# Patient Record
Sex: FEMALE | Race: WHITE | NOT HISPANIC OR LATINO
[De-identification: names, ages, dates, MRNs, and addresses within clinical notes are randomized per-mention and may not be internally consistent; named-entity substitution may affect disease eponyms.]

---

## 2018-04-30 ENCOUNTER — APPOINTMENT (OUTPATIENT)
Dept: CARDIOLOGY | Facility: CLINIC | Age: 53
End: 2018-04-30
Payer: COMMERCIAL

## 2018-04-30 VITALS
DIASTOLIC BLOOD PRESSURE: 70 MMHG | SYSTOLIC BLOOD PRESSURE: 115 MMHG | HEART RATE: 67 BPM | WEIGHT: 138 LBS | HEIGHT: 65 IN | BODY MASS INDEX: 22.99 KG/M2 | RESPIRATION RATE: 16 BRPM

## 2018-04-30 PROCEDURE — 99213 OFFICE O/P EST LOW 20 MIN: CPT

## 2018-04-30 PROCEDURE — 93000 ELECTROCARDIOGRAM COMPLETE: CPT | Mod: 59

## 2018-11-08 ENCOUNTER — APPOINTMENT (OUTPATIENT)
Dept: CARDIOLOGY | Facility: CLINIC | Age: 53
End: 2018-11-08
Payer: COMMERCIAL

## 2018-11-08 VITALS
SYSTOLIC BLOOD PRESSURE: 110 MMHG | BODY MASS INDEX: 22.99 KG/M2 | RESPIRATION RATE: 18 BRPM | DIASTOLIC BLOOD PRESSURE: 70 MMHG | WEIGHT: 138 LBS | HEIGHT: 65 IN | HEART RATE: 65 BPM

## 2018-11-08 PROCEDURE — 93000 ELECTROCARDIOGRAM COMPLETE: CPT

## 2018-11-08 PROCEDURE — 99213 OFFICE O/P EST LOW 20 MIN: CPT

## 2018-11-08 NOTE — REASON FOR VISIT
[FreeTextEntry1] : Patient had presented here for a cardiac evaluation with concerns about periodic palpitations. Most often nocturnal. Not associated with any other symptoms. Heart rate seems to be regular. Cannot identify any provocation.

## 2018-11-08 NOTE — HISTORY OF PRESENT ILLNESS
[FreeTextEntry1] : Has a history of a right bundle branch block. Does not have any known structural heart disease by echo several years ago.. Denies any exertional symptoms.\par Under some degree of stress with regard to her children. Many years postmenopausal.\par underwent 24 hour holter\par symptoms worse with stress and late day coffee.

## 2018-11-08 NOTE — ASSESSMENT
[FreeTextEntry1] : ECG: Normal sinus rhythm at 65 beats per minute. Right bundle branch block pattern. No significant ST or T wave changes. Unchanged from baseline.\par \par 24-hour Holter monitor showed an average heart rate 72. Range of  beats per minute. An occasional APC. Nothing that correlated well with symptoms.\par \par Impression patient with new onset of nocturnal palpitations that do not seem to be associated with any pathology\par Baseline right bundle branch block pattern unchanged.\par Very atypical anterior chest wall pain syndrome.\par Increased stress  and caffeine are provoking agents\par \par \par Plan:\par Cut back considerably on her caffeine.\par Relaxation techniques.\par Increase exercise\par Followup in 1 year

## 2020-07-08 ENCOUNTER — APPOINTMENT (OUTPATIENT)
Dept: CARDIOLOGY | Facility: CLINIC | Age: 55
End: 2020-07-08
Payer: COMMERCIAL

## 2020-07-08 ENCOUNTER — NON-APPOINTMENT (OUTPATIENT)
Age: 55
End: 2020-07-08

## 2020-07-08 VITALS
HEIGHT: 65 IN | OXYGEN SATURATION: 98 % | SYSTOLIC BLOOD PRESSURE: 118 MMHG | RESPIRATION RATE: 16 BRPM | BODY MASS INDEX: 21.66 KG/M2 | WEIGHT: 130 LBS | DIASTOLIC BLOOD PRESSURE: 65 MMHG | TEMPERATURE: 97.6 F | HEART RATE: 67 BPM

## 2020-07-08 PROCEDURE — 99214 OFFICE O/P EST MOD 30 MIN: CPT

## 2020-07-08 PROCEDURE — 93000 ELECTROCARDIOGRAM COMPLETE: CPT

## 2020-07-08 RX ORDER — MULTIVITAMIN
TABLET ORAL
Refills: 0 | Status: ACTIVE | COMMUNITY

## 2020-07-08 RX ORDER — UBIDECARENONE/VIT E ACET 100MG-5
100 CAPSULE ORAL
Refills: 0 | Status: ACTIVE | COMMUNITY

## 2020-07-08 RX ORDER — MENTHOL/CAMPHOR 0.5 %-0.5%
1000 LOTION (ML) TOPICAL
Refills: 0 | Status: ACTIVE | COMMUNITY

## 2020-07-08 NOTE — ASSESSMENT
[FreeTextEntry1] : ECG: Normal sinus rhythm at 67 beats per minute. Right bundle branch block pattern. No significant ST or T wave changes. Unchanged from baseline.\par \par 24-hour Holter monitor  4/30/18  showed an average heart rate 72. Range of  beats per minute. An occasional APC. Nothing that correlated well with symptoms.\par \par Impression:\par Patient with periodic nocturnal palpitations that do not seem to be associated with any pathology\par Baseline right bundle branch block pattern unchanged.\par Very atypical anterior chest wall pain syndrome.\par Increased stress  and caffeine are provoking agents\par \par \par Plan:\par Cut back considerably on her caffeine.\par Relaxation techniques.\par Increase exercise\par Followup in 1 year

## 2020-07-08 NOTE — REASON FOR VISIT
[FreeTextEntry1] : Patient  presents here for a cardiac evaluation with concerns about periodic palpitations. Most often nocturnal. Not associated with any other symptoms. Heart rate seems to be regular. Cannot identify any provocation.

## 2020-07-08 NOTE — HISTORY OF PRESENT ILLNESS
[FreeTextEntry1] : Has a history of a right bundle branch block. Does not have any known structural heart disease by echo several years ago.. Denies any exertional symptoms.\par Under some degree of stress with regard to her children. Many years postmenopausal.\par \par symptoms worse with stress and late day coffee.\par \par Also has complaints about some jaw pain with radiation into her neck and anterior chest wall. This too seems to be increased with stress.

## 2021-05-26 ENCOUNTER — APPOINTMENT (OUTPATIENT)
Dept: OTOLARYNGOLOGY | Facility: CLINIC | Age: 56
End: 2021-05-26

## 2021-12-01 ENCOUNTER — APPOINTMENT (OUTPATIENT)
Dept: BREAST CENTER | Facility: CLINIC | Age: 56
End: 2021-12-01
Payer: COMMERCIAL

## 2021-12-01 VITALS
HEIGHT: 65 IN | BODY MASS INDEX: 21.49 KG/M2 | WEIGHT: 129 LBS | DIASTOLIC BLOOD PRESSURE: 71 MMHG | HEART RATE: 73 BPM | SYSTOLIC BLOOD PRESSURE: 116 MMHG

## 2021-12-01 DIAGNOSIS — Z80.3 FAMILY HISTORY OF MALIGNANT NEOPLASM OF BREAST: ICD-10-CM

## 2021-12-01 DIAGNOSIS — Z80.1 FAMILY HISTORY OF MALIGNANT NEOPLASM OF TRACHEA, BRONCHUS AND LUNG: ICD-10-CM

## 2021-12-01 PROCEDURE — 99204 OFFICE O/P NEW MOD 45 MIN: CPT

## 2021-12-01 RX ORDER — MULTIVIT-MIN/IRON/FOLIC ACID/K 18-600-40
CAPSULE ORAL
Refills: 0 | Status: ACTIVE | COMMUNITY

## 2021-12-01 RX ORDER — PNV NO.95/FERROUS FUM/FOLIC AC 28MG-0.8MG
TABLET ORAL
Refills: 0 | Status: ACTIVE | COMMUNITY

## 2021-12-01 NOTE — HISTORY OF PRESENT ILLNESS
[FreeTextEntry1] : Ms. Healy is a 56 year old woman who presents for a consultation for breast pain. As she was reluctant to be on bisphosphonates for osteoporosis, starting in January this year, she was started on pellets of bioidentical hormones with estradiol and testosterone that were injected in the pelvis every 4 months. She was also started on progesterone pills. By June, she had significant breast pain diffusely in both breasts that woke her up at night if she moved, and was improved when her bra was on. No palpable breast or axillary lumps, nipple discharge, or skin changes. In October she had postmenopausal vaginal bleeding and underwent a D&C which was benign.\par \par Her family history is significant for breast cancer in a paternal aunt in her 40's.

## 2021-12-01 NOTE — PAST MEDICAL HISTORY
[Menarche Age ____] : age at menarche was [unfilled] [Menopause Age____] : age at menopause was [unfilled] [Total Preg ___] : G[unfilled] [Live Births ___] : P[unfilled]  [Age At Live Birth ___] : Age at live birth: [unfilled] [FreeTextEntry8] : 2 mo

## 2021-12-01 NOTE — CONSULT LETTER
[Dear  ___] : Dear  [unfilled], [Consult Letter:] : I had the pleasure of evaluating your patient, [unfilled]. [Please see my note below.] : Please see my note below. [Consult Closing:] : Thank you very much for allowing me to participate in the care of this patient.  If you have any questions, please do not hesitate to contact me. [Sincerely,] : Sincerely, [FreeTextEntry3] : Pratibha Spivey MD FACS  [DrNhi  ___] : Dr. HUITRON

## 2022-01-19 ENCOUNTER — NON-APPOINTMENT (OUTPATIENT)
Age: 57
End: 2022-01-19

## 2022-02-09 ENCOUNTER — APPOINTMENT (OUTPATIENT)
Dept: CARDIOLOGY | Facility: CLINIC | Age: 57
End: 2022-02-09
Payer: COMMERCIAL

## 2022-02-09 VITALS
OXYGEN SATURATION: 98 % | HEART RATE: 54 BPM | BODY MASS INDEX: 21.66 KG/M2 | SYSTOLIC BLOOD PRESSURE: 110 MMHG | RESPIRATION RATE: 16 BRPM | DIASTOLIC BLOOD PRESSURE: 70 MMHG | WEIGHT: 130 LBS | HEIGHT: 65 IN

## 2022-02-09 PROCEDURE — 99214 OFFICE O/P EST MOD 30 MIN: CPT

## 2022-02-09 PROCEDURE — 93000 ELECTROCARDIOGRAM COMPLETE: CPT

## 2022-02-09 RX ORDER — THYROID, PORCINE 30 MG/1
30 TABLET ORAL
Refills: 0 | Status: DISCONTINUED | COMMUNITY
End: 2022-02-09

## 2022-02-09 NOTE — HISTORY OF PRESENT ILLNESS
[FreeTextEntry1] : Has a history of a right bundle branch block. Does not have any known structural heart disease by echo several years ago.. Denies any exertional symptoms.\par Under some degree of stress with regard to her children. Many years postmenopausal.\par \par symptoms worse with stress and late day coffee.\par

## 2022-02-09 NOTE — ASSESSMENT
[FreeTextEntry1] : ECG: Sinus bradycardia 54 bpm.  Right bundle branch block pattern.  No significant CTB changes.\par \par 24-hour Holter monitor  4/30/18  showed an average heart rate 72. Range of  beats per minute. An occasional APC. Nothing that correlated well with symptoms.\par \par Impression:\par 1.  Patient has a positional sensation that she is hearing her heartbeat in her ear while on her pillow in bed at night.\par This is only occurred on 2 occasions and the heartbeat is regular and had a normal rate.\par I explained to her that this is most likely the benign apposition of an artery up against her eustachian canal.\par \par 2.  Patient with periodic nocturnal palpitations that do not seem to be associated with any pathology.\par     The symptoms are longstanding, possibly exacerbated by caffeine, and seem most consistent withatrial or ventricular extrasystoles.\par \par 3.Baseline right bundle branch block pattern unchanged.\par \par Plan:\par 1.  Reassured her that the aforementioned auditory symptoms are not necessarily representative of pathology unless they began to occur when she is upright and through the day then I would not proceed with any further testing.\par \par 2.Cut back considerably on her caffeine.\par \par 3.Relaxation techniques.\par \par 4.  Continue all of her regular exercise\par Followup in 1 year

## 2022-02-09 NOTE — REASON FOR VISIT
[FreeTextEntry1] : Patient  presents here for a cardiac evaluation with concerns about \par nocturnal complaints of hearing her heartbeat in her ear.  \par This is new and distinct from her usual periodic palpitations, which also tend to be often nocturnal. Not associated with any other symptoms. Heart rate seems to be regular. Cannot identify any provocation.\par \par Otherwise feels fairly well.  Exercises regularly without difficulty.\par No shortness of breath chest pain.  No PND orthopnea edema.\par Exercise includes both aerobic as well as resistance.\par Has augmented her resistance and response to some osteopenia.\par Drinks several cups of coffee a day.

## 2022-07-12 ENCOUNTER — APPOINTMENT (OUTPATIENT)
Dept: BREAST CENTER | Facility: CLINIC | Age: 57
End: 2022-07-12

## 2022-07-12 VITALS
DIASTOLIC BLOOD PRESSURE: 60 MMHG | BODY MASS INDEX: 21.49 KG/M2 | WEIGHT: 129 LBS | SYSTOLIC BLOOD PRESSURE: 96 MMHG | HEART RATE: 63 BPM | HEIGHT: 65 IN

## 2022-07-12 DIAGNOSIS — R92.2 INCONCLUSIVE MAMMOGRAM: ICD-10-CM

## 2022-07-12 DIAGNOSIS — Z87.898 PERSONAL HISTORY OF OTHER SPECIFIED CONDITIONS: ICD-10-CM

## 2022-07-12 PROCEDURE — 99214 OFFICE O/P EST MOD 30 MIN: CPT

## 2022-07-12 NOTE — PHYSICAL EXAM
[Normocephalic] : normocephalic [Atraumatic] : atraumatic [Sclera nonicteric] : sclera nonicteric [Supple] : supple [No Supraclavicular Adenopathy] : no supraclavicular adenopathy [No Cervical Adenopathy] : no cervical adenopathy [Clear to Auscultation Bilat] : clear to auscultation bilaterally [Normal Sinus Rhythm] : normal sinus rhythm [No Rubs] : no pericardial rub [Examined in the supine and seated position] : examined in the supine and seated position [Bra Size: ___] : Bra Size: [unfilled] [No dominant masses] : no dominant masses in right breast  [No dominant masses] : no dominant masses left breast [No Nipple Retraction] : no left nipple retraction [No Nipple Discharge] : no left nipple discharge [No Axillary Lymphadenopathy] : no left axillary lymphadenopathy [Soft] : abdomen soft [No Edema] : no edema [No Rashes] : no rashes [No Ulceration] : no ulceration

## 2022-07-12 NOTE — HISTORY OF PRESENT ILLNESS
[FreeTextEntry1] : Ms. Healy is a 57 year old woman here for a follow-up for breast pain. She was reluctant to be on bisphosphonates for osteoporosis and was started in January 2021 on injections of pellets of bioidentical hormones with estradiol and testosterone, as well as progesterone pills. By June 2021, she had significant diffuse bilateral breast pain that woke her up at night if she moved, and was improved when her bra was on. She had postmenopausal vaginal bleeding in October 2021 and underwent a D&C which was benign. Since then, she is off the progesterone pills. Her last injection was of the lowest dose of testosterone, and she hopes to wean off after one more injection. She no longer has any breast pain.\par \par Her family history is significant for breast cancer in a paternal aunt in her 40's.

## 2023-03-30 ENCOUNTER — NON-APPOINTMENT (OUTPATIENT)
Age: 58
End: 2023-03-30

## 2023-03-30 ENCOUNTER — APPOINTMENT (OUTPATIENT)
Dept: CARDIOLOGY | Facility: CLINIC | Age: 58
End: 2023-03-30
Payer: COMMERCIAL

## 2023-03-30 VITALS
HEIGHT: 65 IN | HEART RATE: 70 BPM | BODY MASS INDEX: 21.99 KG/M2 | DIASTOLIC BLOOD PRESSURE: 60 MMHG | RESPIRATION RATE: 14 BRPM | SYSTOLIC BLOOD PRESSURE: 100 MMHG | WEIGHT: 132 LBS

## 2023-03-30 DIAGNOSIS — Z09 ENCOUNTER FOR FOLLOW-UP EXAMINATION AFTER COMPLETED TREATMENT FOR CONDITIONS OTHER THAN MALIGNANT NEOPLASM: ICD-10-CM

## 2023-03-30 PROCEDURE — 99214 OFFICE O/P EST MOD 30 MIN: CPT | Mod: 25

## 2023-03-30 PROCEDURE — 93000 ELECTROCARDIOGRAM COMPLETE: CPT

## 2023-03-30 NOTE — REASON FOR VISIT
[FreeTextEntry1] : ALEXIA LAZO is a 57 year-old F presents here for cardiac evaluation following recent ER visit for palpitations and new onset atrial fibrillation. \par

## 2023-03-30 NOTE — ASSESSMENT
[FreeTextEntry1] : EKG: sinus rhythm at 70 bpm, RBBB as seen on prior EKG's , low voltage in precordial leads\par \par Impression:\par \par 1. New onset atrial fibrillation. [id number] score 0 (female, no other risk factors). EKG now shows sinus rhythm and RBBB (unchanged from prior). \par \par 2. Hx of milder palpitations, unclear whether she has been in atrial fibrillation in the past. \par \par 3. Blood pressure low normal, no symptoms of hypotension. \par \par 4. No anginal symptoms. EKG shows no ischemic changes. \par \par Plan:\par \par 1. 30-day event monitor to evaluate for recurrence of atrial fibrillation. \par \par 2. Will hold off on anticoagulation for now given [id number] score 0, suggesting low risk. \par \par 3. Exercise stress test to evaluate for any exercise induced ischemia. \par \par 4. Echocardiogram to evaluate for any structural hear disease. \par \par 5. Decrease caffeine intake. Avoid alcohol. Ensure adequate hydration. \par \par Clinical followup in 6 weeks or sooner if needed. \par

## 2023-03-30 NOTE — PHYSICAL EXAM
[Normal S1, S2] : normal S1, S2 [Murmur] : murmur [Normal] : alert and oriented, normal memory [de-identified] : I/VI systolic murmur

## 2023-03-30 NOTE — HISTORY OF PRESENT ILLNESS
[FreeTextEntry1] : Reports a history of palpitations usually lasting a few minutes and resolving on its own, occurring once a month. On 3/22/23, while  drinking coffee, experienced palpitations "stronger than usual". At  PMD -  EKG with new onset atrial fibrillation with RVR. She was sent to [facility] ER for evaluation where palpitations resolved on its own and she converted back to sinus rhythm. \par \par Patient reports increased stress lately related to her daughter being injured, and her  having a new project. She has not had much sleep lately due to her dog that has a condition and she has to wake up much earlier now. \par \par Drinks 2 large cups of coffee per day, 1 glass of wine per week. Admits to drinking very little water throughout the day. \par \par She denies any shortness of breath, MONTERO, chest pain, presyncope or syncope.

## 2023-04-19 ENCOUNTER — APPOINTMENT (OUTPATIENT)
Dept: CARDIOLOGY | Facility: CLINIC | Age: 58
End: 2023-04-19

## 2023-05-01 ENCOUNTER — APPOINTMENT (OUTPATIENT)
Dept: CARDIOLOGY | Facility: CLINIC | Age: 58
End: 2023-05-01
Payer: COMMERCIAL

## 2023-05-01 PROCEDURE — 93015 CV STRESS TEST SUPVJ I&R: CPT

## 2023-05-11 ENCOUNTER — APPOINTMENT (OUTPATIENT)
Dept: CARDIOLOGY | Facility: CLINIC | Age: 58
End: 2023-05-11
Payer: COMMERCIAL

## 2023-05-11 PROCEDURE — 93306 TTE W/DOPPLER COMPLETE: CPT

## 2023-06-08 ENCOUNTER — APPOINTMENT (OUTPATIENT)
Dept: CARDIOLOGY | Facility: CLINIC | Age: 58
End: 2023-06-08
Payer: COMMERCIAL

## 2023-06-08 VITALS
OXYGEN SATURATION: 98 % | BODY MASS INDEX: 21.33 KG/M2 | DIASTOLIC BLOOD PRESSURE: 60 MMHG | HEART RATE: 57 BPM | RESPIRATION RATE: 16 BRPM | HEIGHT: 65 IN | SYSTOLIC BLOOD PRESSURE: 104 MMHG | WEIGHT: 128 LBS

## 2023-06-08 DIAGNOSIS — I48.91 UNSPECIFIED ATRIAL FIBRILLATION: ICD-10-CM

## 2023-06-08 DIAGNOSIS — R00.2 PALPITATIONS: ICD-10-CM

## 2023-06-08 DIAGNOSIS — I45.10 UNSPECIFIED RIGHT BUNDLE-BRANCH BLOCK: ICD-10-CM

## 2023-06-08 DIAGNOSIS — R07.89 OTHER CHEST PAIN: ICD-10-CM

## 2023-06-08 PROCEDURE — 99214 OFFICE O/P EST MOD 30 MIN: CPT

## 2023-06-08 NOTE — ASSESSMENT
[FreeTextEntry1] : Event monitor 4/21-5/5/2023\par Average heart rate-65\par Minimum heart rate-42\par Maximum heart rate-161\par \par Echocardiogram 5/11/2023:\par Normal LV size and function LVEF 60%\par No significant valvular disease or pericardial effusion.\par \par Stress test 5/1/2023:\par Time: 7 minutes (7 METS)\par Heart rate: 169 Parenta 106%)\par Blood pressure: Normal response 150/70\par ECG: No significant ST-T wave changes.\par \par Impression:\par \par 1. New onset atrial fibrillation. [id number] score 0 (female, no other risk factors). EKG now shows sinus rhythm and RBBB (unchanged from prior).  Event monitoring clearly demonstrates short but multiple recurrences of PAF.  Longest run 19 beats \par fastest 146 bpm.\par \par 2. Hx of milder palpitations, unclear whether she has been in atrial fibrillation in the past.  Has PVCs as well.\par \par 3. Blood pressure low normal, no symptoms of hypotension. \par \par 4. No anginal symptoms. EKG shows no ischemic changes. \par \par Plan:\par \par 1.  Aspirin 81 mg p.o. daily.\par \par 2.  Toprol XL 25 mg half tablet p.o. nightly.\par \par 3. Decrease caffeine intake. Avoid alcohol. Ensure adequate hydration. \par \par Clinical followup in 6 weeks or sooner if needed. \par

## 2023-06-08 NOTE — REVIEW OF SYSTEMS
[Palpitations] : palpitations [Negative] : Heme/Lymph [Weight Loss (___ Lbs)] : [unfilled] ~Ulb weight loss [Weight Gain (___ Lbs)] : no recent weight gain

## 2023-06-08 NOTE — PHYSICAL EXAM
[Normal S1, S2] : normal S1, S2 [Murmur] : murmur [Normal] : alert and oriented, normal memory [de-identified] : I/VI systolic murmur

## 2023-06-08 NOTE — REASON FOR VISIT
[FreeTextEntry1] : ALEXIA LAZO is a 57 year-old F presents here for cardiac evaluation regarding history of palpitations and new onset atrial fibrillation. \par \par She has undergone echocardiography, exercise stress testing, 2-week event monitoring.\par \par States some increase of general life stresses with rare palpitations.  Nothing protracted\par

## 2023-06-08 NOTE — HISTORY OF PRESENT ILLNESS
[FreeTextEntry1] : Reports a history of palpitations usually lasting a few minutes and resolving on its own, occurring once a month. On 3/22/23, while  drinking coffee, experienced palpitations "stronger than usual". At  PMD -  EKG with new onset atrial fibrillation with RVR. She was sent to [facility] ER for evaluation where palpitations resolved on its own and she converted back to sinus rhythm. \par \par Patient reports increased stress lately related to her daughter being injured, and her  having a new project. She has not had much sleep lately due to her dog that has a condition and she has to wake up much earlier now. \par \par Drinks 2 large cups of coffee per day, 1 glass of wine per week. Admits to drinking very little water throughout the day. \par \par She denies any shortness of breath, MONTERO, chest pain, presyncope or syncope.

## 2023-09-14 ENCOUNTER — APPOINTMENT (OUTPATIENT)
Dept: CARDIOLOGY | Facility: CLINIC | Age: 58
End: 2023-09-14

## 2023-12-29 ENCOUNTER — NON-APPOINTMENT (OUTPATIENT)
Age: 58
End: 2023-12-29

## 2024-03-11 NOTE — PHYSICAL EXAM
[Atraumatic] : atraumatic [Normocephalic] : normocephalic [Supple] : supple [Sclera nonicteric] : sclera nonicteric [No Supraclavicular Adenopathy] : no supraclavicular adenopathy [No Cervical Adenopathy] : no cervical adenopathy [Normal Sinus Rhythm] : normal sinus rhythm [Clear to Auscultation Bilat] : clear to auscultation bilaterally [Examined in the supine and seated position] : examined in the supine and seated position [Bra Size: ___] : Bra Size: [unfilled] [No dominant masses] : no dominant masses in right breast  [No dominant masses] : no dominant masses left breast [No Nipple Retraction] : no left nipple retraction [No Nipple Discharge] : no left nipple discharge [No Axillary Lymphadenopathy] : no left axillary lymphadenopathy [No Rashes] : no rashes [No Edema] : no edema [No Ulceration] : no ulceration

## 2024-03-12 ENCOUNTER — APPOINTMENT (OUTPATIENT)
Dept: BREAST CENTER | Facility: CLINIC | Age: 59
End: 2024-03-12
Payer: COMMERCIAL

## 2024-03-12 VITALS
BODY MASS INDEX: 22.02 KG/M2 | HEIGHT: 64 IN | HEART RATE: 63 BPM | WEIGHT: 129 LBS | SYSTOLIC BLOOD PRESSURE: 110 MMHG | DIASTOLIC BLOOD PRESSURE: 66 MMHG

## 2024-03-12 DIAGNOSIS — N64.4 MASTODYNIA: ICD-10-CM

## 2024-03-12 DIAGNOSIS — Z12.39 ENCOUNTER FOR OTHER SCREENING FOR MALIGNANT NEOPLASM OF BREAST: ICD-10-CM

## 2024-03-12 PROCEDURE — 99214 OFFICE O/P EST MOD 30 MIN: CPT

## 2024-03-12 RX ORDER — METOPROLOL TARTRATE 25 MG/1
25 TABLET, FILM COATED ORAL DAILY
Qty: 90 | Refills: 1 | Status: DISCONTINUED | COMMUNITY
Start: 2023-06-08 | End: 2024-03-12

## 2024-03-12 RX ORDER — PROGESTERONE 200 MG/1
CAPSULE ORAL
Refills: 0 | Status: DISCONTINUED | COMMUNITY
End: 2024-03-12

## 2024-03-12 RX ORDER — CHROMIUM 200 MCG
TABLET ORAL
Refills: 0 | Status: DISCONTINUED | COMMUNITY
End: 2024-03-12

## 2024-03-12 NOTE — ASSESSMENT
[FreeTextEntry1] : Ms. Healy is a 58 year old woman with breast pain while on hormone replacement that has resolved with cessation of the hormone replacement. Her breast exam today is without suspicious findings. Routine breast care is reviewed. She can follow up on an as needed basis. She understands and is comfortable with the plan. She is encouraged to call if any questions or concerns arise.

## 2024-03-12 NOTE — HISTORY OF PRESENT ILLNESS
[FreeTextEntry1] : Ms. Healy is a 58 year old woman here for a follow-up for breast pain. She was reluctant to be on bisphosphonates for osteoporosis and was started in January 2021 on injections of pellets of bioidentical hormones with estradiol and testosterone, as well as progesterone pills. By June 2021, she had significant diffuse bilateral breast pain that woke her up at night if she moved, and was improved when her bra was on. She had postmenopausal vaginal bleeding in October 2021 and underwent a D&C which was benign. Since then, she is off the progesterone pills. Her last injection was of the lowest dose of testosterone, and she hopes to wean off after one more injection. She no longer had any breast pain. Since the last office visit, she has also been done with the testosterone injections. No more breast pain. No lumps.   Her family history is significant for breast cancer in a paternal aunt in her 40's.

## 2024-03-12 NOTE — PAST MEDICAL HISTORY
[Menarche Age ____] : age at menarche was [unfilled] [Menopause Age____] : age at menopause was [unfilled] [Live Births ___] : P[unfilled]  [Total Preg ___] : G[unfilled] [Age At Live Birth ___] : Age at live birth: [unfilled] [FreeTextEntry8] : 2 mo

## 2024-03-12 NOTE — DATA REVIEWED
[FreeTextEntry1] : I have independently reviewed the reports and the images.   B/l mammogram and US 3/7/24 - heterogenously dense - BIRADS 1

## 2024-04-15 ENCOUNTER — APPOINTMENT (OUTPATIENT)
Dept: RHEUMATOLOGY | Facility: CLINIC | Age: 59
End: 2024-04-15
Payer: COMMERCIAL

## 2024-04-15 VITALS
BODY MASS INDEX: 22.02 KG/M2 | SYSTOLIC BLOOD PRESSURE: 110 MMHG | DIASTOLIC BLOOD PRESSURE: 80 MMHG | HEIGHT: 64 IN | TEMPERATURE: 87 F | OXYGEN SATURATION: 99 % | WEIGHT: 129 LBS | HEART RATE: 64 BPM

## 2024-04-15 DIAGNOSIS — Z78.0 ASYMPTOMATIC MENOPAUSAL STATE: ICD-10-CM

## 2024-04-15 DIAGNOSIS — M81.0 AGE-RELATED OSTEOPOROSIS W/OUT CURRENT PATHOLOGICAL FRACTURE: ICD-10-CM

## 2024-04-15 DIAGNOSIS — Z83.438 FAMILY HISTORY OF OTHER DISORDER OF LIPOPROTEIN METABOLISM AND OTHER LIPIDEMIA: ICD-10-CM

## 2024-04-15 DIAGNOSIS — Z83.79 FAMILY HISTORY OF OTHER DISEASES OF THE DIGESTIVE SYSTEM: ICD-10-CM

## 2024-04-15 DIAGNOSIS — Z82.49 FAMILY HISTORY OF ISCHEMIC HEART DISEASE AND OTHER DISEASES OF THE CIRCULATORY SYSTEM: ICD-10-CM

## 2024-04-15 DIAGNOSIS — L30.9 DERMATITIS, UNSPECIFIED: ICD-10-CM

## 2024-04-15 DIAGNOSIS — Z83.3 FAMILY HISTORY OF DIABETES MELLITUS: ICD-10-CM

## 2024-04-15 PROCEDURE — G2211 COMPLEX E/M VISIT ADD ON: CPT

## 2024-04-15 PROCEDURE — 36415 COLL VENOUS BLD VENIPUNCTURE: CPT

## 2024-04-15 PROCEDURE — 99204 OFFICE O/P NEW MOD 45 MIN: CPT

## 2024-04-15 RX ORDER — ALENDRONATE SODIUM 70 MG/1
70 TABLET ORAL
Qty: 12 | Refills: 3 | Status: ACTIVE | COMMUNITY
Start: 2024-04-15 | End: 1900-01-01

## 2024-04-15 NOTE — REASON FOR VISIT
[Initial Eval - Existing Diagnosis] : an initial evaluation of an existing diagnosis [FreeTextEntry1] : osteoporosis

## 2024-04-15 NOTE — HISTORY OF PRESENT ILLNESS
[FreeTextEntry1] : 58F with osteoporosis here for evaluation. Had pAfib which occurred during a COVID infection in 2023, has not occurred since then, is on ASA 81 mg QD.  She notes she was on OP treatment recently by PCP, stopped in 12/2023 due to side effects: was taking subQ estrogen and testosterone pellets for 2 years (prescribed by PCP), but stopped taking in 12/2023 due to vaginal spotting and other adverse symptoms. Had complication of uterine thickening so needed D&C 6 months after the high dose hormone therapy. Then later tried again at very low doses- still d/c'ed as had side effects.   She had early menopause at around age 40. She has had DEXAs dating as early as 2019 showing osteoporosis.  She drinks 1 glass of wine per week.  No hx of tobacco use but was exposed to secondhand smoke from father, . Not in the last 20 years has she been exposed.  Used prednisone for approx 3 weeks last year from COVID infection. No other long term steroid use.  No hx of PPI use.  No known parental history of hip fractures.  States she has a possible root canal pending but this is unclear. No other invasive dental work pending. Mentions history of TMJ issues.   Taking daily vitamin D 1000U supplement and daily calcium supplement.  [Weight Loss] : no weight loss [Arthralgias] : no arthralgias [Joint Swelling] : no joint swelling [Difficulty Standing] : no difficulty standing [Difficulty Walking] : no difficulty walking

## 2024-04-15 NOTE — PHYSICAL EXAM
[General Appearance - Alert] : alert [General Appearance - In No Acute Distress] : in no acute distress [General Appearance - Well Developed] : well developed [General Appearance - Well-Appearing] : healthy appearing [Sclera] : the sclera and conjunctiva were normal [Extraocular Movements] : extraocular movements were intact [Outer Ear] : the ears and nose were normal in appearance [Edema] : there was no peripheral edema [Musculoskeletal - Swelling] : no joint swelling seen [Skin Color & Pigmentation] : normal skin color and pigmentation [] : no rash [No Focal Deficits] : no focal deficits [Oriented To Time, Place, And Person] : oriented to person, place, and time [Affect] : the affect was normal [Mood] : the mood was normal

## 2024-04-15 NOTE — ASSESSMENT
[FreeTextEntry1] : 58F with early menopause at age 40, pAfib on ASA 81 mg QD (only occurred once, per patient, after she had COVID Infection last year), and osteoporosis, here for evaluation of osteoporosis which she has had since at least 2019 based on prior DEXA scans. Most recent DEXA was done in 1/2024 which shows lowest total T score of -2.5 at femoral neck, indicating osteoporosis. Osteoporosis is likely in setting of premature menopause for this patient.   Previously tried hormonal therapy with estrogen/testosterone subQ pellets per PCP, but these gave her side effects. Risks/benefits of PO vs. IV bisphosphonates, vs. SubQ Prolia were discussed. At this time patient opts for weekly alendronate; she was advised to take first thing in the morning once weekly on empty stomach, with full glass of water and no other medications; and to remain upright for 30-60 minutes. Side effects including rare risk of jaw osteonecrosis were mentioned. She was advised to let me know if undergoing invasive dental work. She was advised to continue daily calcium and vitamin D supplementation.  Will check CMP, TSH, Vitamin D, PTH. Follow up in 1 year, sooner if experiencing side effects from alendronate.

## 2024-04-16 LAB
25(OH)D3 SERPL-MCNC: 77 NG/ML
ALBUMIN SERPL ELPH-MCNC: 4.3 G/DL
ALP BLD-CCNC: 46 U/L
ALT SERPL-CCNC: 16 U/L
ANION GAP SERPL CALC-SCNC: 12 MMOL/L
AST SERPL-CCNC: 20 U/L
BILIRUB SERPL-MCNC: 0.3 MG/DL
BUN SERPL-MCNC: 23 MG/DL
CALCIUM SERPL-MCNC: 9.5 MG/DL
CALCIUM SERPL-MCNC: 9.5 MG/DL
CHLORIDE SERPL-SCNC: 104 MMOL/L
CO2 SERPL-SCNC: 26 MMOL/L
CREAT SERPL-MCNC: 0.72 MG/DL
EGFR: 96 ML/MIN/1.73M2
GLUCOSE SERPL-MCNC: 94 MG/DL
PARATHYROID HORMONE INTACT: 28 PG/ML
POTASSIUM SERPL-SCNC: 4.4 MMOL/L
PROT SERPL-MCNC: 6.3 G/DL
SODIUM SERPL-SCNC: 142 MMOL/L
TSH SERPL-ACNC: 1.78 UIU/ML

## 2024-07-29 ENCOUNTER — APPOINTMENT (OUTPATIENT)
Dept: PULMONOLOGY | Facility: CLINIC | Age: 59
End: 2024-07-29
Payer: COMMERCIAL

## 2024-07-29 VITALS — RESPIRATION RATE: 16 BRPM | HEIGHT: 67 IN | WEIGHT: 130 LBS | BODY MASS INDEX: 20.4 KG/M2

## 2024-07-29 VITALS — DIASTOLIC BLOOD PRESSURE: 68 MMHG | HEART RATE: 99 BPM | OXYGEN SATURATION: 70 % | SYSTOLIC BLOOD PRESSURE: 116 MMHG

## 2024-07-29 DIAGNOSIS — R93.89 ABNORMAL FINDINGS ON DIAGNOSTIC IMAGING OF OTHER SPECIFIED BODY STRUCTURES: ICD-10-CM

## 2024-07-29 PROCEDURE — 99204 OFFICE O/P NEW MOD 45 MIN: CPT

## 2024-07-29 NOTE — HISTORY OF PRESENT ILLNESS
[TextBox_4] : Very pleasant woman 58 yo comes for evaluation of abnormal CT chest Patient of Luan Hardin Generally healthy Ostoporsis, just started on Fosomax Hx Covid x2, last time in 2023--was quite ill for two weeks Developed a fib--saw cardiology x2, now in NSR and just on asa  Nonsmoker, no previous pulmonary disease or asthma, allergies Parents were heavy smokers Possible asbestos exposure at home recently and as a child in [place] school  In late Dec 2023, developed swelling in the neck ? adenopathy?? Sent for CT neck in Jan, which was negative CXR suggested RUL density Then sent for CT chest at [facility] in Feb 2024: Reviewed with patient Biapical scarring , more on right, possible 4 mm noudle vs scarring and rough pleura , noncalcified in upper right lung No adenopathy, otherwise negative  No med allergies Only on asa and fosomax Worked in 's [employer] and still does parttime She is Spanish,  sicilian, born in [facility] Only surgery is bunions and D and C 2 daughters in 20s Parents alive and well

## 2024-07-29 NOTE — CONSULT LETTER
[Dear  ___] : Dear  [unfilled], [FreeTextEntry1] : I had the pleasure of evaluating your patient, ALEXIA LAZO , in the office today.  Please review my consultation and evaluation report that follows below.  Please do not hesitate to call me if further information is necessary or if you wish to discuss ongoing care or diagnostic work-up.    I very much appreciate your referral and it is a privilege to be able to provide care for your patient.  Sincerely,   Brian Duarte MD, MHCM, FACP, ISAURA-C Pulmonary Medicine  of Medicine [facility] [facility] [employer] [facility] [email or web address] -Pulmonary in [address] [place]  [address]    Fax   [facility] at [address]  [phone]

## 2024-07-29 NOTE — ASSESSMENT
[FreeTextEntry1] : Very pleasant woman 60 yo comes for evaluation of abnormal CT chest Patient of Luan Hardin Generally healthy Ostoporsis, just started on Fosomax Hx Covid x2, last time in 2023--was quite ill for two weeks Developed a fib--saw cardiology x2, now in NSR and just on asa  Nonsmoker, no previous pulmonary disease or asthma, allergies Parents were heavy smokers Possible asbestos exposure at home recently and as a child in [place] school  In late Dec 2023, developed swelling in the neck ? adenopathy?? Sent for CT neck in Jan, which was negative CXR suggested RUL density Then sent for CT chest at [facility] in Feb 2024: Reviewed with patient Biapical scarring , more on right, possible 4 mm noudle vs scarring and rough pleura , noncalcified in upper right lung No adenopathy, otherwise negative  No med allergies Only on asa and fosomax Worked in 's [employer] and still does parttime She is [place],  sicilian, born in [facility] Only surgery is bunions and D and C 2 daughters in 20s Parents alive and well  Imp  60 yo woman with abnormal CT chest Hx osteoporosis, previous a fib, nonsmoker altho had second hand smoke as a child Findings on CT appear old and postinflammatory She is low risk for pulmonary neoplasm but not NO risk Recommend repeat CT in Feb 2025 at one year and return here

## 2024-09-16 ENCOUNTER — APPOINTMENT (OUTPATIENT)
Dept: BREAST CENTER | Facility: CLINIC | Age: 59
End: 2024-09-16

## 2024-09-16 NOTE — HISTORY OF PRESENT ILLNESS
[FreeTextEntry1] : Ms. Healy is a 59 year old woman here for a follow-up for breast pain. She has a history of breast pain while on estradiol, progesterone, and testosterone for osteoporosis which improved once off hormone replacement therapy.   Her family history is significant for breast cancer in a paternal aunt in her 40's.

## 2025-04-15 ENCOUNTER — LABORATORY RESULT (OUTPATIENT)
Age: 60
End: 2025-04-15

## 2025-04-15 ENCOUNTER — APPOINTMENT (OUTPATIENT)
Dept: RHEUMATOLOGY | Facility: CLINIC | Age: 60
End: 2025-04-15
Payer: COMMERCIAL

## 2025-04-15 VITALS
SYSTOLIC BLOOD PRESSURE: 116 MMHG | OXYGEN SATURATION: 97 % | WEIGHT: 130 LBS | BODY MASS INDEX: 20.4 KG/M2 | HEIGHT: 67 IN | DIASTOLIC BLOOD PRESSURE: 68 MMHG | HEART RATE: 77 BPM | TEMPERATURE: 97.6 F

## 2025-04-15 DIAGNOSIS — M79.672 PAIN IN LEFT FOOT: ICD-10-CM

## 2025-04-15 DIAGNOSIS — R41.89 OTHER SYMPTOMS AND SIGNS INVOLVING COGNITIVE FUNCTIONS AND AWARENESS: ICD-10-CM

## 2025-04-15 DIAGNOSIS — M81.0 AGE-RELATED OSTEOPOROSIS W/OUT CURRENT PATHOLOGICAL FRACTURE: ICD-10-CM

## 2025-04-15 PROCEDURE — 99214 OFFICE O/P EST MOD 30 MIN: CPT

## 2025-04-15 PROCEDURE — G2211 COMPLEX E/M VISIT ADD ON: CPT | Mod: NC

## 2025-04-15 PROCEDURE — 36415 COLL VENOUS BLD VENIPUNCTURE: CPT

## 2025-04-16 LAB
25(OH)D3 SERPL-MCNC: 60.3 NG/ML
ALBUMIN SERPL ELPH-MCNC: 4.3 G/DL
ALP BLD-CCNC: 52 U/L
ALT SERPL-CCNC: 11 U/L
ANCA AB SER-IMP: ABNORMAL
ANION GAP SERPL CALC-SCNC: 12 MMOL/L
APPEARANCE: ABNORMAL
AST SERPL-CCNC: 15 U/L
BILIRUB SERPL-MCNC: 0.3 MG/DL
BILIRUBIN URINE: NEGATIVE
BLOOD URINE: NEGATIVE
BUN SERPL-MCNC: 19 MG/DL
C-ANCA SER-ACNC: NEGATIVE
C3 SERPL-MCNC: 106 MG/DL
C4 SERPL-MCNC: 30 MG/DL
CALCIUM SERPL-MCNC: 9.8 MG/DL
CCP AB SER IA-ACNC: <8 U/ML
CHLORIDE SERPL-SCNC: 104 MMOL/L
CO2 SERPL-SCNC: 25 MMOL/L
COLOR: YELLOW
CREAT SERPL-MCNC: 0.65 MG/DL
CREAT SPEC-SCNC: 125 MG/DL
CREAT/PROT UR: 0.1 RATIO
CRP SERPL-MCNC: 5 MG/L
DSDNA AB SER-ACNC: 1 IU/ML
EGFRCR SERPLBLD CKD-EPI 2021: 100 ML/MIN/1.73M2
ENA RNP AB SER IA-ACNC: 0.3 AL
ENA SM AB SER IA-ACNC: <0.2 AL
ENA SS-A AB SER IA-ACNC: <0.2 AL
ENA SS-B AB SER IA-ACNC: <0.2 AL
ERYTHROCYTE [SEDIMENTATION RATE] IN BLOOD BY WESTERGREN METHOD: 12 MM/HR
GLUCOSE QUALITATIVE U: NEGATIVE MG/DL
GLUCOSE SERPL-MCNC: 73 MG/DL
KETONES URINE: NEGATIVE MG/DL
LEUKOCYTE ESTERASE URINE: NEGATIVE
NITRITE URINE: NEGATIVE
P-ANCA TITR SER IF: NEGATIVE
PH URINE: 5.5
POTASSIUM SERPL-SCNC: 4.4 MMOL/L
PROT SERPL-MCNC: 6.4 G/DL
PROT UR-MCNC: 18 MG/DL
PROTEIN URINE: NEGATIVE MG/DL
RF+CCP IGG SER-IMP: NEGATIVE
RHEUMATOID FACT SER QL: <10 IU/ML
SODIUM SERPL-SCNC: 141 MMOL/L
SPECIFIC GRAVITY URINE: 1.03
UROBILINOGEN URINE: 0.2 MG/DL

## 2025-04-18 LAB — HISTONE AB SER QL: 0.8 UNITS

## 2025-04-19 LAB
ANA SER IF-ACNC: NEGATIVE
B BURGDOR AB SER-IMP: NEGATIVE
B BURGDOR IGG+IGM SER QL: 0.04 INDEX

## 2025-05-01 ENCOUNTER — OFFICE (OUTPATIENT)
Dept: URBAN - METROPOLITAN AREA CLINIC 104 | Facility: CLINIC | Age: 60
Setting detail: OPHTHALMOLOGY
End: 2025-05-01
Payer: COMMERCIAL

## 2025-05-01 ENCOUNTER — RX ONLY (RX ONLY)
Age: 60
End: 2025-05-01

## 2025-05-01 DIAGNOSIS — S05.01XA: ICD-10-CM

## 2025-05-01 PROCEDURE — 92071 CONTACT LENS FITTING FOR TX: CPT | Mod: RT | Performed by: OPTOMETRIST

## 2025-05-01 PROCEDURE — 99203 OFFICE O/P NEW LOW 30 MIN: CPT | Performed by: OPTOMETRIST

## 2025-05-01 ASSESSMENT — CONFRONTATIONAL VISUAL FIELD TEST (CVF)
OS_FINDINGS: FULL
OD_FINDINGS: FULL

## 2025-05-01 ASSESSMENT — LID EXAM ASSESSMENTS: OD_COMMENTS: LID EVERTED - NO FB

## 2025-05-01 ASSESSMENT — VISUAL ACUITY
OS_BCVA: 20/150+1
OD_BCVA: 20/20

## 2025-05-02 ENCOUNTER — OFFICE (OUTPATIENT)
Dept: URBAN - METROPOLITAN AREA CLINIC 114 | Facility: CLINIC | Age: 60
Setting detail: OPHTHALMOLOGY
End: 2025-05-02
Payer: COMMERCIAL

## 2025-05-02 DIAGNOSIS — S05.01XD: ICD-10-CM

## 2025-05-02 PROCEDURE — 99213 OFFICE O/P EST LOW 20 MIN: CPT | Performed by: OPTOMETRIST

## 2025-05-02 PROCEDURE — 92071 CONTACT LENS FITTING FOR TX: CPT | Mod: RT | Performed by: OPTOMETRIST

## 2025-05-02 ASSESSMENT — LID EXAM ASSESSMENTS: OD_COMMENTS: LID EVERTED - NO FB

## 2025-05-02 ASSESSMENT — CONFRONTATIONAL VISUAL FIELD TEST (CVF)
OD_FINDINGS: FULL
OS_FINDINGS: FULL

## 2025-05-02 ASSESSMENT — VISUAL ACUITY
OD_BCVA: 20/20
OS_BCVA: 20/50

## 2025-05-05 ENCOUNTER — OFFICE (OUTPATIENT)
Dept: URBAN - METROPOLITAN AREA CLINIC 104 | Facility: CLINIC | Age: 60
Setting detail: OPHTHALMOLOGY
End: 2025-05-05
Payer: COMMERCIAL

## 2025-05-05 ENCOUNTER — RX ONLY (RX ONLY)
Age: 60
End: 2025-05-05

## 2025-05-05 DIAGNOSIS — H16.221: ICD-10-CM

## 2025-05-05 DIAGNOSIS — S05.01XD: ICD-10-CM

## 2025-05-05 PROCEDURE — 99213 OFFICE O/P EST LOW 20 MIN: CPT | Performed by: OPTOMETRIST

## 2025-05-05 ASSESSMENT — CONFRONTATIONAL VISUAL FIELD TEST (CVF)
OS_FINDINGS: FULL
OD_FINDINGS: FULL

## 2025-05-05 ASSESSMENT — VISUAL ACUITY
OD_BCVA: 20/20
OS_BCVA: 20/20

## 2025-05-05 ASSESSMENT — SUPERFICIAL PUNCTATE KERATITIS (SPK): OD_SPK: T

## 2025-05-05 ASSESSMENT — LID EXAM ASSESSMENTS: OD_COMMENTS: LID EVERTED - NO FB

## 2025-05-13 DIAGNOSIS — M81.0 AGE-RELATED OSTEOPOROSIS W/OUT CURRENT PATHOLOGICAL FRACTURE: ICD-10-CM

## 2025-05-28 ENCOUNTER — APPOINTMENT (OUTPATIENT)
Dept: BREAST CENTER | Facility: CLINIC | Age: 60
End: 2025-05-28
Payer: COMMERCIAL

## 2025-05-28 VITALS
DIASTOLIC BLOOD PRESSURE: 68 MMHG | HEIGHT: 67 IN | SYSTOLIC BLOOD PRESSURE: 107 MMHG | BODY MASS INDEX: 20.4 KG/M2 | WEIGHT: 130 LBS | HEART RATE: 55 BPM

## 2025-05-28 DIAGNOSIS — R92.30 DENSE BREASTS, UNSPECIFIED: ICD-10-CM

## 2025-05-28 DIAGNOSIS — Z80.3 FAMILY HISTORY OF MALIGNANT NEOPLASM OF BREAST: ICD-10-CM

## 2025-05-28 DIAGNOSIS — Z12.39 ENCOUNTER FOR OTHER SCREENING FOR MALIGNANT NEOPLASM OF BREAST: ICD-10-CM

## 2025-05-28 PROCEDURE — 99214 OFFICE O/P EST MOD 30 MIN: CPT

## 2025-07-14 ENCOUNTER — OFFICE (OUTPATIENT)
Dept: URBAN - METROPOLITAN AREA CLINIC 104 | Facility: CLINIC | Age: 60
Setting detail: OPHTHALMOLOGY
End: 2025-07-14
Payer: COMMERCIAL

## 2025-07-14 DIAGNOSIS — H25.813: ICD-10-CM

## 2025-07-14 DIAGNOSIS — H43.813: ICD-10-CM

## 2025-07-14 DIAGNOSIS — S05.01XD: ICD-10-CM

## 2025-07-14 PROBLEM — D31.31 CHOROIDAL NEVUS, RIGHT EYE: Status: ACTIVE | Noted: 2025-07-14

## 2025-07-14 PROCEDURE — 92250 FUNDUS PHOTOGRAPHY W/I&R: CPT | Performed by: OPTOMETRIST

## 2025-07-14 PROCEDURE — 92014 COMPRE OPH EXAM EST PT 1/>: CPT | Performed by: OPTOMETRIST

## 2025-07-14 ASSESSMENT — KERATOMETRY
OS_AXISANGLE_DEGREES: 160
OD_K2POWER_DIOPTERS: 44.70
OD_AXISANGLE_DEGREES: 021
OS_K1POWER_DIOPTERS: 44.88
OD_K1POWER_DIOPTERS: 43.83
OS_K2POWER_DIOPTERS: 45.06

## 2025-07-14 ASSESSMENT — CONFRONTATIONAL VISUAL FIELD TEST (CVF)
OD_FINDINGS: FULL
OS_FINDINGS: FULL

## 2025-07-14 ASSESSMENT — VISUAL ACUITY
OD_BCVA: 20/20-1
OS_BCVA: 20/25-2